# Patient Record
Sex: MALE | Race: WHITE | NOT HISPANIC OR LATINO | Employment: OTHER | URBAN - METROPOLITAN AREA
[De-identification: names, ages, dates, MRNs, and addresses within clinical notes are randomized per-mention and may not be internally consistent; named-entity substitution may affect disease eponyms.]

---

## 2017-11-20 ENCOUNTER — OUTSIDE FACILITY SERVICE (OUTPATIENT)
Dept: CARDIOLOGY | Facility: CLINIC | Age: 62
End: 2017-11-20

## 2017-11-20 PROCEDURE — 93306 TTE W/DOPPLER COMPLETE: CPT | Performed by: INTERNAL MEDICINE

## 2017-12-05 ENCOUNTER — OFFICE VISIT (OUTPATIENT)
Dept: CARDIOLOGY | Facility: CLINIC | Age: 62
End: 2017-12-05

## 2017-12-05 VITALS
SYSTOLIC BLOOD PRESSURE: 136 MMHG | HEART RATE: 73 BPM | DIASTOLIC BLOOD PRESSURE: 90 MMHG | WEIGHT: 241 LBS | HEIGHT: 70 IN | BODY MASS INDEX: 34.5 KG/M2

## 2017-12-05 DIAGNOSIS — E78.2 MIXED HYPERLIPIDEMIA: ICD-10-CM

## 2017-12-05 DIAGNOSIS — I48.0 PAROXYSMAL ATRIAL FIBRILLATION (HCC): Primary | ICD-10-CM

## 2017-12-05 DIAGNOSIS — I10 ESSENTIAL HYPERTENSION: ICD-10-CM

## 2017-12-05 DIAGNOSIS — E66.9 OBESITY (BMI 30-39.9): ICD-10-CM

## 2017-12-05 PROCEDURE — 93000 ELECTROCARDIOGRAM COMPLETE: CPT | Performed by: INTERNAL MEDICINE

## 2017-12-05 PROCEDURE — 99204 OFFICE O/P NEW MOD 45 MIN: CPT | Performed by: INTERNAL MEDICINE

## 2017-12-05 RX ORDER — ATORVASTATIN CALCIUM 20 MG/1
40 TABLET, FILM COATED ORAL EVERY OTHER DAY
COMMUNITY
End: 2022-08-09

## 2017-12-05 RX ORDER — IBUPROFEN 800 MG/1
800 TABLET ORAL EVERY 6 HOURS PRN
COMMUNITY

## 2017-12-05 RX ORDER — OMEPRAZOLE 20 MG/1
20 CAPSULE, DELAYED RELEASE ORAL DAILY
COMMUNITY

## 2017-12-05 RX ORDER — DILTIAZEM HYDROCHLORIDE 120 MG/1
240 TABLET, FILM COATED ORAL DAILY
COMMUNITY
End: 2018-06-19

## 2017-12-05 RX ORDER — DILTIAZEM HYDROCHLORIDE 240 MG/1
240 CAPSULE, COATED, EXTENDED RELEASE ORAL DAILY
Qty: 30 CAPSULE | Refills: 11 | Status: SHIPPED | OUTPATIENT
Start: 2017-12-05 | End: 2018-06-19 | Stop reason: SDUPTHER

## 2017-12-05 NOTE — PROGRESS NOTES
Encounter Date:12/05/2017    Location: [facility]    Masha Camacho MD    Patient ID: Alberto Ram is a 62 y.o. 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    1955  Subjective:      Chief Complaint/Reason for visit:    Chief Complaint   Patient presents with   • Atrial Fibrillation       Problem List:  1. Atrial Fibrillation   A. [facility] ED presentation 11/10/17 with EKG revealing Atrail Fibrillation at rate of 156. S/P External Cardioversion with restoration of NSR.  B. Echocardiogram 11/20/17 revealing LVEF 55 %, Trace MR, Trace TR   C. History of cardiac cath 12/21/2009 revealing normal coronary arteries, LVEF 55 %.  D. CHADSVasc= 1  2. Hypertension   3. Hyperlipidemia   4. GERD/Hiatal Hernia  5. Osteoarthritis    HPI: Mr. Ram is a 62 yr old white male with the above noted medical history who presents in consultation referred by Dr. Mendiola for further evaluation of Atrial Fibrillation. On 11/10/17 he awakened from sleep with a fast heart rate.  He had no associated symptoms.  He was noted to be in Atrial Fibrillation at rate of 156. He underwent an external cardioversion with restoration of normal sinus rhythm.  He was placed on Cardizem 120 mg po daily in the ED and has had no further symptoms.  He denies any symptoms of chest pain, shortness of breath, palpitations, edema, syncope or heart failure symptoms.    Social History     Social History   • Marital status:      Spouse name: N/A   • Number of children: N/A   • Years of education: N/A     Occupational History   • Not on file.     Social History Main Topics   • Smoking status: Never Smoker   • Smokeless tobacco: Never Used   • Alcohol use No   • Drug use: No   • Sexual activity: Not on file     Other Topics Concern   • Not on file     Social History Narrative   • No narrative on file       family history includes Cancer in his mother; Heart disease in his brother and sister.     has a past medical history of Atrial  "fibrillation; GERD (gastroesophageal reflux disease); Hyperlipidemia; Hypertension; and Osteoarthritis.    No Known Allergies      Current Outpatient Prescriptions:   •  aspirin 81 MG tablet, Take 81 mg by mouth Daily., Disp: , Rfl:   •  atorvastatin (LIPITOR) 20 MG tablet, Take 20 mg by mouth Daily., Disp: , Rfl:   •  diltiaZEM (CARDIZEM) 120 MG tablet, Take 120 mg by mouth Daily., Disp: , Rfl:   •  ibuprofen (ADVIL,MOTRIN) 800 MG tablet, Take 800 mg by mouth Every 6 (Six) Hours As Needed for Mild Pain ., Disp: , Rfl:   •  omeprazole (priLOSEC) 20 MG capsule, Take 20 mg by mouth Daily., Disp: , Rfl:     Review of Systems   Constitution: Positive for weight gain.   HENT: Negative.    Eyes: Negative.         Wears glasses   Cardiovascular: Negative for chest pain, dyspnea on exertion, irregular heartbeat, leg swelling, near-syncope, orthopnea, palpitations, paroxysmal nocturnal dyspnea and syncope.   Respiratory: Positive for snoring.    Endocrine: Negative.    Hematologic/Lymphatic: Negative.    Skin: Negative.    Musculoskeletal: Positive for arthritis, back pain and joint pain.   Gastrointestinal: Positive for heartburn.        Hiatal hernia   Genitourinary: Negative.    Neurological: Negative.    Psychiatric/Behavioral: Negative.    Allergic/Immunologic: Negative.        Vitals:    12/05/17 0940 12/05/17 1011   BP: 158/90 136/90   BP Location: Left arm    Patient Position: Sitting    Pulse: 73    Weight: 109 kg (241 lb)    Height: 177.8 cm (70\")          Objective:       Physical Exam   Constitutional: He is oriented to person, place, and time. He appears well-developed and well-nourished.   HENT:   Head: Normocephalic and atraumatic.   Neck: Normal range of motion. No thyromegaly present.   Cardiovascular: Normal rate, regular rhythm and normal heart sounds.  Exam reveals no gallop and no friction rub.    No murmur heard.  Pulmonary/Chest: Effort normal and breath sounds normal. No respiratory distress. He has " no wheezes. He has no rales.   Abdominal: Soft. Bowel sounds are normal.   Musculoskeletal: Normal range of motion. He exhibits no edema.   Neurological: He is alert and oriented to person, place, and time.   Skin: Skin is warm and dry.   Psychiatric: He has a normal mood and affect. His behavior is normal. Judgment and thought content normal.   Vitals reviewed.      Data Review:     ECG 12 Lead  Date/Time: 2017 10:05 AM  Performed by: HAZEL MCCULLOUGH  Authorized by: HAZEL MCCULLOUGH   Comparison: compared with previous ECG   Comparison to previous EC/10/17  Rhythm: sinus rhythm  Rate: normal  BPM: 74  ST Segments: ST segments normal  T Waves: T waves normal  QRS axis: normal  Other: no other findings  Clinical impression: normal ECG             Assessment:      Diagnosis Plan   1. Paroxysmal atrial fibrillation     2. Essential hypertension     3. Mixed hyperlipidemia     4. Obesity (BMI 30-39.9)       Plan:   1. Increase Cardizem to 240 mg po daily for better blood pressure control.  2. Continue ASA  3. Lipid profile and sleep evaluation per Dr. Mendiola  4. Follow up in 6 months  5. Weight loss recommended with diet and exercise.    Scribed for Hazel Mccullough MD by Dorothy Clemente. 2017  10:14 AM    I, Hazel Mccullough MD, personally performed the services described in this documentation as scribed by the above named individual in my presence, and it is both accurate and complete.  2017  11:41 AM        Please note that portions of this note may have been completed with a voice recognition program. Efforts were made to edit the dictations, but occasionally words are mistranscribed.

## 2018-06-19 ENCOUNTER — OFFICE VISIT (OUTPATIENT)
Dept: CARDIOLOGY | Facility: CLINIC | Age: 63
End: 2018-06-19

## 2018-06-19 VITALS
HEIGHT: 69 IN | SYSTOLIC BLOOD PRESSURE: 102 MMHG | HEART RATE: 64 BPM | DIASTOLIC BLOOD PRESSURE: 70 MMHG | WEIGHT: 229 LBS | BODY MASS INDEX: 33.92 KG/M2

## 2018-06-19 DIAGNOSIS — I48.0 PAROXYSMAL ATRIAL FIBRILLATION (HCC): Primary | ICD-10-CM

## 2018-06-19 DIAGNOSIS — E78.2 MIXED HYPERLIPIDEMIA: ICD-10-CM

## 2018-06-19 DIAGNOSIS — I10 ESSENTIAL HYPERTENSION: ICD-10-CM

## 2018-06-19 PROCEDURE — 99213 OFFICE O/P EST LOW 20 MIN: CPT | Performed by: INTERNAL MEDICINE

## 2018-06-19 RX ORDER — DILTIAZEM HYDROCHLORIDE 240 MG/1
240 CAPSULE, COATED, EXTENDED RELEASE ORAL DAILY
Qty: 30 CAPSULE | Refills: 11 | Status: SHIPPED | OUTPATIENT
Start: 2018-06-19 | End: 2019-07-01 | Stop reason: SDUPTHER

## 2018-06-19 RX ORDER — LISINOPRIL 20 MG/1
20 TABLET ORAL DAILY
COMMUNITY
End: 2019-07-16

## 2018-06-19 NOTE — PROGRESS NOTES
Encounter Date:06/19/2018      Patient ID: Alberto Ram is a 63 y.o. male.    Chief Complaint: Atrial Fibrillation    PROBLEM LIST:  1. Paroxysmal Atrial fibrillation:   a. [facility] ED presentation (11/10/2017) with EKG revealing Atrail Fibrillation at rate of 156. S/P External Cardioversion with restoration of NSR.  b. Echocardiogram (11/20/2017): revealing LVEF 55 %, Trace MR, Trace TR.  c. History of cardiac cath (12/21/2009): revealing normal coronary arteries, LVEF 55%.  d. CHADSVasc= 1.  2. Hypertension.   3. Hyperlipidemia.   4. GERD/Hiatal hernia.  5. Osteoarthritis.     History of Present Illness  Patient presents today for follow-up with a history of paroxysmal atrial fibrillation and cardiac risk factors.  Since last visit he has done very well from Cardec standpoint.  Has not experienced any recurrence of atrial fibrillation or palpitations. Denies chest pain, shortness of breath, leg swelling,  and syncope. Remains busy and active with various activities without limitations..    No Known Allergies      Current Outpatient Prescriptions:   •  aspirin 81 MG tablet, Take 81 mg by mouth Daily., Disp: , Rfl:   •  atorvastatin (LIPITOR) 20 MG tablet, Take 20 mg by mouth Daily. Off and on, Disp: , Rfl:   •  diltiaZEM CD (CARDIZEM CD) 240 MG 24 hr capsule, Take 1 capsule by mouth Daily., Disp: 30 capsule, Rfl: 11  •  ibuprofen (ADVIL,MOTRIN) 800 MG tablet, Take 800 mg by mouth Every 6 (Six) Hours As Needed for Mild Pain ., Disp: , Rfl:   •  lisinopril (PRINIVIL,ZESTRIL) 20 MG tablet, Take 20 mg by mouth Daily., Disp: , Rfl:   •  metFORMIN (GLUCOPHAGE) 500 MG tablet, Take 500 mg by mouth Daily With Breakfast., Disp: , Rfl:   •  omeprazole (priLOSEC) 20 MG capsule, Take 20 mg by mouth Daily., Disp: , Rfl:     The following portions of the patient's history were reviewed and updated as appropriate: allergies, current medications, past family history, past medical history, past social history, past  "surgical history and problem list.    ROS  Review of Systems   Constitution: Negative for chills, fever, weight gain and weight loss.   Cardiovascular: Negative for chest pain, claudication, dyspnea on exertion, leg swelling, orthopnea, palpitations, paroxysmal nocturnal dyspnea and syncope.        No dizziness   Gastrointestinal: Negative for abdominal pain, constipation, diarrhea, nausea and vomiting.   Genitourinary:        No urinary symptoms   Neurological:        No symptoms of stroke.   All other systems reviewed and are negative.    Objective:     Blood pressure 102/70, pulse 64, height 175.3 cm (69\"), weight 104 kg (229 lb).      Physical Exam  Constitutional: She appears well-developed and well-nourished.   HENT:   HEENT exam unremarkable.   Neck: Neck supple. No JVD present.   No carotid bruits.   Cardiovascular: Normal rate, regular rhythm and normal heart sounds.    No murmur heard.  2 plus symmetric pulses.   Pulmonary/Chest: Breath sounds normal. Does not exhibit tenderness.   Abdominal:   Abdomen benign.   Musculoskeletal: Does not exhibit edema.   Neurological:   Neurological exam unremarkable.   Vitals reviewed.    Lab Review:   Procedures       Assessment:      Diagnosis Plan   1. Paroxysmal atrial fibrillation  Maintaining sinus rhythm, stable.     2. Essential hypertension  Well controlled, continue current medications.     3. Mixed hyperlipidemia  Continue Lipitor.       Plan:   Stable cardiac status.  Continue current medications.   FU in 12 MO, sooner as needed.  Thank you for allowing us to participate in the care of your patient.     Madison Mccullough MD, FACC, [facility]        Please note that portions of this note may have been completed with a voice recognition program. Efforts were made to edit the dictations, but occasionally words are mistranscribed.    "

## 2019-07-01 RX ORDER — DILTIAZEM HYDROCHLORIDE 240 MG/1
240 CAPSULE, COATED, EXTENDED RELEASE ORAL DAILY
Qty: 30 CAPSULE | Refills: 1 | Status: SHIPPED | OUTPATIENT
Start: 2019-07-01 | End: 2019-07-16 | Stop reason: SDUPTHER

## 2019-07-16 ENCOUNTER — OFFICE VISIT (OUTPATIENT)
Dept: CARDIOLOGY | Facility: CLINIC | Age: 64
End: 2019-07-16

## 2019-07-16 VITALS
HEART RATE: 60 BPM | SYSTOLIC BLOOD PRESSURE: 112 MMHG | HEIGHT: 70 IN | BODY MASS INDEX: 34.5 KG/M2 | WEIGHT: 241 LBS | DIASTOLIC BLOOD PRESSURE: 70 MMHG

## 2019-07-16 DIAGNOSIS — E78.2 MIXED HYPERLIPIDEMIA: ICD-10-CM

## 2019-07-16 DIAGNOSIS — I10 ESSENTIAL HYPERTENSION: ICD-10-CM

## 2019-07-16 DIAGNOSIS — I48.0 PAROXYSMAL ATRIAL FIBRILLATION (HCC): Primary | ICD-10-CM

## 2019-07-16 PROBLEM — M19.90 OSTEOARTHRITIS: Status: ACTIVE | Noted: 2019-07-16

## 2019-07-16 PROBLEM — R73.03 PREDIABETES: Status: ACTIVE | Noted: 2019-07-16

## 2019-07-16 PROCEDURE — 99214 OFFICE O/P EST MOD 30 MIN: CPT | Performed by: PHYSICIAN ASSISTANT

## 2019-07-16 RX ORDER — DILTIAZEM HYDROCHLORIDE 240 MG/1
240 CAPSULE, COATED, EXTENDED RELEASE ORAL DAILY
Qty: 90 CAPSULE | Refills: 3 | Status: SHIPPED | OUTPATIENT
Start: 2019-07-16 | End: 2020-07-21 | Stop reason: SDUPTHER

## 2019-07-16 NOTE — PROGRESS NOTES
Encounter Date:07/16/2019        Patient ID: Alberto Ram is a 64 y.o. male.    PCP: Edison Trujillo MD     Chief Complaint: Paroxysmal atrial fibrillation (CMS/HCC)      PROBLEM LIST:  1. Paroxysmal atrial fibrillation:   a. [facility] ED presentation (11/10/2017) with EKG revealing Atrail Fibrillation at rate of 156. S/P ECV with restoration of NSR.  b. Echocardiogram (11/20/2017): EF 55 %, Trace MR, Trace TR.  c. History of cardiac cath (12/21/2009): revealing normal coronary arteries, LVEF 55%.  d. CHADSVasc= 1.  2. Hypertension.   3. Hyperlipidemia.   4. Prediabetes  5. GERD/Hiatal hernia.  6. Osteoarthritis.     History of Present Illness  Patient presents today for annual follow-up with a history of paroxysmal atrial fibrillation and cardiac risk factors.  Returns today with no complaint of exertional chest pain or dyspnea orthopnea no PND no claudication no lower extremity edema.  He has no awareness of tachyarrhythmias, no dizziness or syncope.  Blood pressures at home run between 120 to 130 mmHg.  His cholesterol is followed by primary care and reported to be favorable on current medical regimen.  He states compliance with current medical regimen reports no significant side effects.  He requests refill of diltiazem.    No Known Allergies      Current Outpatient Medications:   •  aspirin 81 MG tablet, Take 81 mg by mouth Daily., Disp: , Rfl:   •  atorvastatin (LIPITOR) 20 MG tablet, Take 20 mg by mouth Daily. Off and on, Disp: , Rfl:   •  diltiaZEM CD (CARDIZEM CD) 240 MG 24 hr capsule, Take 1 capsule by mouth Daily., Disp: 30 capsule, Rfl: 1  •  ibuprofen (ADVIL,MOTRIN) 800 MG tablet, Take 800 mg by mouth Every 6 (Six) Hours As Needed for Mild Pain ., Disp: , Rfl:   •  metFORMIN (GLUCOPHAGE) 500 MG tablet, Take 500 mg by mouth Daily With Breakfast., Disp: , Rfl:   •  omeprazole (priLOSEC) 20 MG capsule, Take 20 mg by mouth Daily., Disp: , Rfl:     The following portions of the patient's  "history were reviewed and updated as appropriate: allergies, current medications, past family history, past medical history, past social history, past surgical history and problem list.    ROS  Review of Systems   Constitution: Negative for chills, fatigue, fever, generalized weakness, weight gain and weight loss.   Cardiovascular: Negative for chest pain, claudication, dyspnea on exertion, leg swelling, orthopnea, palpitations, paroxysmal nocturnal dyspnea and syncope.   Respiratory: Negative for cough, shortness of breath, and wheezing.  HENT: Negative for ear pain, nosebleeds, and tinnitus.  Gastrointestinal: Negative for abdominal pain, constipation, diarrhea, nausea and vomiting.   Genitourinary: No urinary symptoms   Musculoskeletal: Negative for muscle cramps.  Neurological: Negative for dizziness, headaches, loss of balance, numbness, and symptoms of stroke.  Psychiatric: Normal mental status.     All other systems reviewed and are negative.    Objective:     /70 (BP Location: Right arm, Patient Position: Sitting)   Pulse 60   Ht 177.8 cm (70\")   Wt 109 kg (241 lb)   BMI 34.58 kg/m²          Physical Exam  Constitutional: Patient appears well-developed and well-nourished.   HENT: HEENT exam unremarkable.   Neck: Neck supple. No JVD present. No carotid bruits.   Cardiovascular: Normal rate, regular rhythm and normal heart sounds. No murmur heard.   2+ symmetric pulses.   Pulmonary/Chest: Breath sounds normal. Does not exhibit tenderness.   Abdominal: Abdomen benign.   Musculoskeletal: Does not exhibit edema.   Neurological: Neurological exam unremarkable.   Vitals reviewed.    Lab Review:      Procedures       Assessment:      Diagnosis Plan   1. Paroxysmal atrial fibrillation (CMS/HCC)   stable, maintaining sinus rhythm.  Refilled diltiazem at current dose   2. Essential hypertension  Well managed,  continue current therapy.     3. Mixed hyperlipidemia   on statin therapy followed by primary care "     Plan:     Stable cardiac status.  Continue current medications.   FU in 12 MO, sooner as needed.  Thank you for allowing us to participate in the care of your patient.         Electronically signed by HADLEY Phelps, 07/16/19, 9:49 AM.      Please note that portions of this note may have been completed with a voice recognition program. Efforts were made to edit the dictations, but occasionally words are mistranscribed.

## 2019-10-04 NOTE — PROGRESS NOTES
Patient: Alberto Ram    YOB: 1955    Date: 10/07/2019    Primary Care Provider: Fran Mendiola MD    Chief Complaint   Patient presents with   • Colon Cancer Screening       SUBJECTIVE:    History of present illness:  I saw the patient in the office today as a consultation for colon cancer screening.  5 years ago he underwent a colonoscopy and was found to have 2 adenomas of the colon.  No GI complaints.    The following portions of the patient's history were reviewed and updated as appropriate: allergies, current medications, past family history, past medical history, past social history, past surgical history and problem list.      Review of Systems   Constitutional: Negative for chills, diaphoresis, fatigue, fever and unexpected weight change.   HENT: Negative for dental problem, drooling, ear discharge, hearing loss, trouble swallowing and voice change.    Eyes: Negative for visual disturbance.   Respiratory: Negative for apnea, cough, choking, chest tightness, shortness of breath and wheezing.    Cardiovascular: Negative for chest pain, palpitations and leg swelling.   Gastrointestinal: Negative for abdominal distention, abdominal pain, blood in stool, constipation, diarrhea, nausea, rectal pain and vomiting.   Endocrine: Negative for cold intolerance and heat intolerance.   Genitourinary: Negative for difficulty urinating, dysuria, flank pain, frequency, hematuria, scrotal swelling and testicular pain.   Musculoskeletal: Negative for back pain, gait problem and joint swelling.   Skin: Negative for color change, rash and wound.   Neurological: Negative for dizziness, seizures, syncope, speech difficulty, weakness, light-headedness, numbness and headaches.   Hematological: Negative for adenopathy. Does not bruise/bleed easily.   Psychiatric/Behavioral: Negative for agitation, behavioral problems and confusion. The patient is not nervous/anxious.        History:  Past Medical History:  "  Diagnosis Date   • GERD (gastroesophageal reflux disease)    • Osteoarthritis        Past Surgical History:   Procedure Laterality Date   • CARDIAC CATHETERIZATION     • FOOT FRACTURE SURGERY Left    • ROTATOR CUFF REPAIR Left        Family History   Problem Relation Age of Onset   • Cancer Mother    • Heart disease Sister    • Heart disease Brother        Social History     Tobacco Use   • Smoking status: Never Smoker   • Smokeless tobacco: Never Used   Substance Use Topics   • Alcohol use: No   • Drug use: No       Medications:   Current Outpatient Medications:   •  aspirin 81 MG tablet, Take 81 mg by mouth Daily., Disp: , Rfl:   •  atorvastatin (LIPITOR) 20 MG tablet, Take 20 mg by mouth Daily. Off and on, Disp: , Rfl:   •  diltiaZEM CD (CARDIZEM CD) 240 MG 24 hr capsule, Take 1 capsule by mouth Daily., Disp: 90 capsule, Rfl: 3  •  ibuprofen (ADVIL,MOTRIN) 800 MG tablet, Take 800 mg by mouth Every 6 (Six) Hours As Needed for Mild Pain ., Disp: , Rfl:   •  metFORMIN (GLUCOPHAGE) 500 MG tablet, Take 500 mg by mouth Daily With Breakfast., Disp: , Rfl:   •  omeprazole (priLOSEC) 20 MG capsule, Take 20 mg by mouth Daily., Disp: , Rfl:        Allergies: No Known Allergies    OBJECTIVE:    Vital Signs:  Vitals:    10/07/19 1310   BP: 145/77   Pulse: 65   Temp: 97.8 °F (36.6 °C)   SpO2: 98%   Weight: 111 kg (245 lb)   Height: 177.8 cm (70\")       Physical Exam:   General Appearance:    Alert, cooperative, in no acute distress   Head:    Normocephalic, without obvious abnormality, atraumatic   Eyes:            Normal.  No scleral icterus.  PERRLA    Lungs:     Clear to auscultation,respirations regular, even and                  unlabored    Heart:    Regular rhythm and normal rate, normal S1 and S2,    Abdomen:     Normal bowel sounds, no masses, no organomegaly, soft        non-tender, non-distended, no guarding,    Extremities:   Moves all extremities well, no edema, no cyanosis, no             redness   Skin:   No " bleeding, bruising or rash   Neurologic:   Normal without gross deficits.   Psychiatric: No evidence of depression or anxiety        Results Review:   None    Review of Systems was reviewed and confirmed as accurate today.    ASSESSMENT/PLAN:    1. History of colon polyps        Recommend a screening colonoscopy for his history of adenomatous colon polyps removed 5 years ago.  I explained the procedure to the patient as well as the risks of bleeding and perforation and they understand the ramifications of these potential complications and they wish to proceed.     Electronically signed by Laci Lin MD  10/07/19  3:09 PM

## 2019-10-07 ENCOUNTER — OFFICE VISIT (OUTPATIENT)
Dept: SURGERY | Facility: CLINIC | Age: 64
End: 2019-10-07

## 2019-10-07 VITALS
WEIGHT: 245 LBS | SYSTOLIC BLOOD PRESSURE: 145 MMHG | HEART RATE: 65 BPM | OXYGEN SATURATION: 98 % | TEMPERATURE: 97.8 F | DIASTOLIC BLOOD PRESSURE: 77 MMHG | HEIGHT: 70 IN | BODY MASS INDEX: 35.07 KG/M2

## 2019-10-07 DIAGNOSIS — Z86.010 HISTORY OF COLON POLYPS: Primary | ICD-10-CM

## 2019-10-07 PROCEDURE — S0260 H&P FOR SURGERY: HCPCS | Performed by: SURGERY

## 2019-10-07 RX ORDER — BISACODYL 5 MG/1
TABLET, DELAYED RELEASE ORAL
Qty: 4 TABLET | Refills: 0 | Status: SHIPPED | OUTPATIENT
Start: 2019-10-07 | End: 2022-02-01

## 2019-10-07 RX ORDER — POLYETHYLENE GLYCOL 3350 17 G/17G
POWDER, FOR SOLUTION ORAL
Qty: 238 G | Refills: 0 | Status: SHIPPED | OUTPATIENT
Start: 2019-10-07 | End: 2022-09-08

## 2019-10-24 ENCOUNTER — TELEPHONE (OUTPATIENT)
Dept: SURGERY | Facility: CLINIC | Age: 64
End: 2019-10-24

## 2019-10-28 ENCOUNTER — OUTSIDE FACILITY SERVICE (OUTPATIENT)
Dept: SURGERY | Facility: CLINIC | Age: 64
End: 2019-10-28

## 2019-10-28 PROCEDURE — 45385 COLONOSCOPY W/LESION REMOVAL: CPT | Performed by: SURGERY

## 2020-07-21 ENCOUNTER — OFFICE VISIT (OUTPATIENT)
Dept: CARDIOLOGY | Facility: CLINIC | Age: 65
End: 2020-07-21

## 2020-07-21 VITALS
DIASTOLIC BLOOD PRESSURE: 70 MMHG | WEIGHT: 242 LBS | HEART RATE: 84 BPM | SYSTOLIC BLOOD PRESSURE: 148 MMHG | HEIGHT: 70 IN | BODY MASS INDEX: 34.65 KG/M2

## 2020-07-21 DIAGNOSIS — E78.2 MIXED HYPERLIPIDEMIA: ICD-10-CM

## 2020-07-21 DIAGNOSIS — I10 ESSENTIAL HYPERTENSION: ICD-10-CM

## 2020-07-21 DIAGNOSIS — I48.0 PAROXYSMAL ATRIAL FIBRILLATION (HCC): Primary | ICD-10-CM

## 2020-07-21 PROCEDURE — 99214 OFFICE O/P EST MOD 30 MIN: CPT | Performed by: INTERNAL MEDICINE

## 2020-07-21 RX ORDER — DILTIAZEM HYDROCHLORIDE 240 MG/1
240 CAPSULE, COATED, EXTENDED RELEASE ORAL DAILY
Qty: 90 CAPSULE | Refills: 3 | Status: SHIPPED | OUTPATIENT
Start: 2020-07-21 | End: 2021-08-17 | Stop reason: SDUPTHER

## 2020-07-21 NOTE — PROGRESS NOTES
[facility] Card[facility]    Encounter Date: 2020    Patient ID: Alberto Ram is a  65 y.o. male.  : 1955     PCP: Fran Mendiola MD       Chief Complaint: Paroxysmal atrial fibrillation (CMS/HCC)      PROBLEM LIST:  1. Paroxysmal atrial fibrillation:   a. [facility] ED presentation on 11/10/2017 with EKG revealing Atrial Fibrillation at rate of 156. S/P ECV with restoration of NSR.  b. Echo, 2017: EF 55 %. Trace MR and TR.  c. CHADSVasc= 1 (HTN)  2. Hypertension.   3. Hyperlipidemia.  4. History of normal LHC, 2009: Normal coronary arteries. EF 55%.  5. Prediabetes  6. GERD/Hiatal hernia.  7. Osteoarthritis.     History of Present Illness  Patient presents today for an annual follow-up with a history of paroxysmal atrial fibrillation and cardiac risk factors. Since last visit, he is done well.  He has no current complaint of exertional chest pain or dyspnea and orthopnea no PND no claudication no lower extremity edema.  He has no awareness of tachyarrhythmias, no dizziness or syncope.  His blood pressure at home runs between 110 to 130 mmHg systolic.  He had a recent lipid panel which is noted below.  He is currently taking atorvastatin every other day due to myalgias.  He states he has had myalgias with 3 previous statin drugs but is unclear which ones.  States he had a recent hemoglobin A1c which was 5.    No Known Allergies      Current Outpatient Medications:   •  aspirin 81 MG tablet, Take 81 mg by mouth Daily., Disp: , Rfl:   •  atorvastatin (LIPITOR) 20 MG tablet, Take 20 mg by mouth Daily. Off and on, Disp: , Rfl:   •  bisacodyl (bisacodyl) 5 MG EC tablet, Take 2 at 3pm and 2 at 7pm the day prior to colonoscopy., Disp: 4 tablet, Rfl: 0  •  diltiaZEM CD (CARDIZEM CD) 240 MG 24 hr capsule, Take 1 capsule by mouth Daily., Disp: 90 capsule, Rfl: 3  •  ibuprofen (ADVIL,MOTRIN) 800 MG tablet, Take 800 mg by mouth Every 6 (Six) Hours As Needed for  "Mild Pain ., Disp: , Rfl:   •  metFORMIN (GLUCOPHAGE) 500 MG tablet, Take 500 mg by mouth Daily With Breakfast., Disp: , Rfl:   •  omeprazole (priLOSEC) 20 MG capsule, Take 20 mg by mouth Daily., Disp: , Rfl:   •  polyethylene glycol (MIRALAX) powder, Mix 238g powder with 64 oz of clear liquid. Starting at 5pm drink 80z every 10-15 minutes until consumed., Disp: 238 g, Rfl: 0    The following portions of the patient's history were reviewed and updated as appropriate: allergies, current medications, past family history, past medical history, past social history, past surgical history and problem list.    ROS  Review of Systems   Constitution: Negative for chills, fever, fatigue, generalized weakness.   Cardiovascular: Negative for chest pain, dyspnea on exertion, leg swelling, palpitations, orthopnea, and syncope.   Respiratory: Negative for cough, shortness of breath, and wheezing.  HENT: Negative for ear pain, nosebleeds, and tinnitus.  Gastrointestinal: Negative for abdominal pain, constipation, diarrhea, nausea and vomiting.   Genitourinary: No urinary symptoms.  Musculoskeletal: Negative for muscle cramps.  Neurological: Negative for dizziness, headaches, loss of balance, numbness, and symptoms of stroke.  Psychiatric: Normal mental status.     All other systems reviewed and are negative.        Objective:     /70 (BP Location: Right arm, Patient Position: Sitting)   Pulse 84   Ht 177.8 cm (70\")   Wt 110 kg (242 lb)   BMI 34.72 kg/m²      Physical Exam  Constitutional: Patient appears well-developed and well-nourished.   HENT: HEENT exam unremarkable.   Neck: Neck supple. No JVD present. No carotid bruits.   Cardiovascular: Normal rate, regular rhythm and normal heart sounds. No murmur heard.   2+ symmetric pulses.   Pulmonary/Chest: Breath sounds normal. Does not exhibit tenderness.   Abdominal: Abdomen benign.   Musculoskeletal: Does not exhibit edema.   Neurological: Neurological exam unremarkable. "   Vitals reviewed.    Data Review:      Procedures       Assessment:      Diagnosis Plan   1. Paroxysmal atrial fibrillation (CMS/HCC)   chronically anticoagulated and maintaining sinus rhythm.  I have refilled his Xarelto at current dose and regimen for 1 year   2. Essential hypertension   overall well managed   3. Mixed hyperlipidemia   not to goal but intolerant to multiple statins.  He is expecting to follow-up with his primary care within the next month     Plan:     Continue current medications.   FU in 6 MO, sooner as needed.  Thank you for allowing us to participate in the care of your patient.         Electronically signed by HADLEY Phelps, 07/21/20, 10:04 AM.      Please note that portions of this note may have been completed with a voice recognition program. Efforts were made to edit the dictations, but occasionally words are mistranscribed.

## 2021-01-26 ENCOUNTER — OFFICE VISIT (OUTPATIENT)
Dept: CARDIOLOGY | Facility: CLINIC | Age: 66
End: 2021-01-26

## 2021-01-26 VITALS
DIASTOLIC BLOOD PRESSURE: 86 MMHG | HEART RATE: 72 BPM | WEIGHT: 235 LBS | HEIGHT: 70 IN | SYSTOLIC BLOOD PRESSURE: 142 MMHG | BODY MASS INDEX: 33.64 KG/M2

## 2021-01-26 DIAGNOSIS — I48.0 PAROXYSMAL ATRIAL FIBRILLATION (HCC): Primary | ICD-10-CM

## 2021-01-26 DIAGNOSIS — E78.2 MIXED HYPERLIPIDEMIA: ICD-10-CM

## 2021-01-26 DIAGNOSIS — I10 ESSENTIAL HYPERTENSION: ICD-10-CM

## 2021-01-26 PROBLEM — Z86.16 HISTORY OF COVID-19: Status: ACTIVE | Noted: 2021-01-26

## 2021-01-26 PROCEDURE — 99214 OFFICE O/P EST MOD 30 MIN: CPT | Performed by: INTERNAL MEDICINE

## 2021-01-26 NOTE — PROGRESS NOTES
[facility] Cardiology    Encounter Date: 2021    Patient ID: Alberto Ram is a 65 y.o. male.  : 1955     PCP: Fran Mendiola MD       Chief Complaint: Paroxysmal atrial fibrillation (CMS/HCC)      PROBLEM LIST:  1. Paroxysmal atrial fibrillation:   rafaela. [facility] ED presentation on 11/10/2017 with EKG revealing Atrial Fibrillation at rate of 156. S/P ECV with restoration of NSR.  b. Echo, 2017: EF 55 %. Trace MR and TR.  c. CHADSVasc= 1 (HTN)  2. Hypertension.   3. Hyperlipidemia.  4. History of normal LHC, 2009: Normal coronary arteries. EF 55%.  5. Prediabetes  6. GERD/Hiatal hernia.  7. Osteoarthritis.     History of Present Illness  Patient presents today for a 6 month follow-up with a history of paroxysmal trial fibrillation and cardiac risk factors. Since last visit, overall he has done well.  He reports having had COVID-19 a few months ago but was fortunate to only have headache and loss of taste and smell.  He has no current complaint exertional chest pain or dyspnea and orthopnea no PND no claudication no lower extremity edema.  He has no awareness of tachyarrhythmias, denies dizziness or syncope.  His blood pressures at home have consistently been running from 120 to 130 mmHg systolic.  When we saw him last year his lipids were not to goal on atorvastatin 20 mg daily.  Since that time his atorvastatin has been increased to 40 mg daily and he is due for repeat lipid panel with his primary care within the next month.  He has no report of myalgias.  He states compliance current med regimen reports no significant adverse side effects.  Request a refill of diltiazem.    No Known Allergies      Current Outpatient Medications:   •  aspirin 81 MG tablet, Take 81 mg by mouth Daily., Disp: , Rfl:   •  atorvastatin (LIPITOR) 20 MG tablet, Take 20 mg by mouth Daily. Off and on, Disp: , Rfl:   •  bisacodyl (bisacodyl) 5 MG EC tablet, Take 2 at 3pm and 2 at  "7pm the day prior to colonoscopy., Disp: 4 tablet, Rfl: 0  •  dilTIAZem CD (CARDIZEM CD) 240 MG 24 hr capsule, Take 1 capsule by mouth Daily., Disp: 90 capsule, Rfl: 3  •  ibuprofen (ADVIL,MOTRIN) 800 MG tablet, Take 800 mg by mouth Every 6 (Six) Hours As Needed for Mild Pain ., Disp: , Rfl:   •  metFORMIN (GLUCOPHAGE) 500 MG tablet, Take 500 mg by mouth Daily With Breakfast., Disp: , Rfl:   •  omeprazole (priLOSEC) 20 MG capsule, Take 20 mg by mouth Daily., Disp: , Rfl:   •  polyethylene glycol (MIRALAX) powder, Mix 238g powder with 64 oz of clear liquid. Starting at 5pm drink 80z every 10-15 minutes until consumed., Disp: 238 g, Rfl: 0    The following portions of the patient's history were reviewed and updated as appropriate: allergies, current medications, past family history, past medical history, past social history, past surgical history and problem list.          Objective:     /86 (BP Location: Left arm, Patient Position: Sitting)   Pulse 72   Ht 177.8 cm (70\")   Wt 107 kg (235 lb)   BMI 33.72 kg/m²      Constitutional:       Appearance: Well-developed.   Pulmonary:      Effort: Pulmonary effort is normal. No respiratory distress.      Breath sounds: Normal breath sounds. No wheezing. No rales.      Comments: Bases clear  Chest:      Chest wall: Not tender to palpatation.   Cardiovascular:      Normal rate. Regular rhythm.      No gallop.   Pulses:     Intact distal pulses.   Musculoskeletal: Normal range of motion.         Data Review:   Lab date: 04/30/2020  • FLP: , , HDL 47,   • CMP: Glu 172, BUN 19, Creat 0.99, eGFR 77, Na 140, K 4.4, Cl 105, CO2 26, Ca 8.7, Alk Phos 69, AST 40, ALT 39  • CBC: WBC 4.9, RBC 4.71, HGB 15.2, HCT 43.4, MCV 92.1, MCH 32.3,   • TSH: 3.9     Procedures       Assessment:      Diagnosis Plan   1. Paroxysmal atrial fibrillation (CMS/HCC)   maintaining sinus rhythm   2. Essential hypertension   overall well managed, refilled diltiazem at " current dose   3. Mixed hyperlipidemia   tolerating increased statin dose and due to be rechecked within the next month by primary care     Plan:     Continue current medications.   FU in 12 MO, sooner as needed.  Thank you for allowing us to participate in the care of your patient.     Electronically signed by HADLEY Phelps, 01/26/21, 1:40 PM EST.      Please note that portions of this note may have been completed with a voice recognition program. Efforts were made to edit the dictations, but occasionally words are mistranscribed.

## 2021-03-19 ENCOUNTER — TELEPHONE (OUTPATIENT)
Dept: CARDIOLOGY | Facility: CLINIC | Age: 66
End: 2021-03-19

## 2021-08-17 RX ORDER — DILTIAZEM HYDROCHLORIDE 240 MG/1
240 CAPSULE, COATED, EXTENDED RELEASE ORAL DAILY
Qty: 90 CAPSULE | Refills: 3 | Status: SHIPPED | OUTPATIENT
Start: 2021-08-17 | End: 2022-02-01 | Stop reason: SDUPTHER

## 2022-02-01 ENCOUNTER — OFFICE VISIT (OUTPATIENT)
Dept: CARDIOLOGY | Facility: CLINIC | Age: 67
End: 2022-02-01

## 2022-02-01 VITALS
SYSTOLIC BLOOD PRESSURE: 146 MMHG | HEART RATE: 85 BPM | HEIGHT: 70 IN | BODY MASS INDEX: 33.93 KG/M2 | DIASTOLIC BLOOD PRESSURE: 89 MMHG | WEIGHT: 237 LBS | OXYGEN SATURATION: 96 %

## 2022-02-01 DIAGNOSIS — I10 ESSENTIAL HYPERTENSION: ICD-10-CM

## 2022-02-01 DIAGNOSIS — E78.2 MIXED HYPERLIPIDEMIA: ICD-10-CM

## 2022-02-01 DIAGNOSIS — I48.0 PAROXYSMAL ATRIAL FIBRILLATION: Primary | ICD-10-CM

## 2022-02-01 PROCEDURE — 99214 OFFICE O/P EST MOD 30 MIN: CPT | Performed by: INTERNAL MEDICINE

## 2022-02-01 RX ORDER — BUTALBITAL, ACETAMINOPHEN AND CAFFEINE 50; 325; 40 MG/1; MG/1; MG/1
TABLET ORAL AS NEEDED
COMMUNITY
Start: 2022-01-08

## 2022-02-01 RX ORDER — DILTIAZEM HYDROCHLORIDE 360 MG/1
360 CAPSULE, EXTENDED RELEASE ORAL DAILY
Qty: 90 CAPSULE | Refills: 3 | Status: SHIPPED | OUTPATIENT
Start: 2022-02-01 | End: 2023-01-16

## 2022-08-02 NOTE — PROGRESS NOTES
[facility] Cardiology    Encounter Date: 2022    Patient ID: Alberto Ram is a 67 y.o. male.  : 1955     PCP: Fran Mendiola MD       Chief Complaint: Paroxysmal atrial fibrillation       PROBLEM LIST:  1. Paroxysmal atrial fibrillation:   [facility] ED presentation on 11/10/2017 with EKG revealing Atrial Fibrillation at rate of 156. S/P ECV with restoration of NSR.  b. Echo, 2017: EF 55 %. Trace MR and TR.  c. CHADSVasc= 1 (HTN)  2. Hypertension.   3. Hyperlipidemia.  4. History of normal LHC, 2009: Normal coronary arteries. EF 55%.  5. Prediabetes  6. GERD/Hiatal hernia.  7. Osteoarthritis.       History of Present Illness  Patient presents today for a 6 month follow-up with a history of paroxysmal atrial fibrillation and cardiac risk factors. Since last visit, the patient has been doing well overall from a cardiovascular standpoint. Patient maintains a stable activity level by mowing yards. Patient denies chest pain, shortness of breath, orthopnea, palpitations, edema, dizziness, and syncope.        No Known Allergies      Current Outpatient Medications:   •  aspirin 81 MG tablet, Take 81 mg by mouth Daily., Disp: , Rfl:   •  butalbital-acetaminophen-caffeine (FIORICET, ESGIC) -40 MG per tablet, As Needed., Disp: , Rfl:   •  dilTIAZem CD (CARDIZEM CD) 360 MG 24 hr capsule, Take 1 capsule by mouth Daily., Disp: 90 capsule, Rfl: 3  •  ibuprofen (ADVIL,MOTRIN) 800 MG tablet, Take 800 mg by mouth Every 6 (Six) Hours As Needed for Mild Pain ., Disp: , Rfl:   •  metFORMIN (GLUCOPHAGE) 500 MG tablet, Take 1,000 mg by mouth 2 (Two) Times a Day With Meals., Disp: , Rfl:   •  omeprazole (priLOSEC) 20 MG capsule, Take 20 mg by mouth Daily., Disp: , Rfl:   •  polyethylene glycol (MIRALAX) powder, Mix 238g powder with 64 oz of clear liquid. Starting at 5pm drink 80z every 10-15 minutes until consumed. (Patient taking differently: As Needed. Mix 238g  "powder with 64 oz of clear liquid. Starting at 5pm drink 80z every 10-15 minutes until consumed.), Disp: 238 g, Rfl: 0  •  rosuvastatin (CRESTOR) 40 MG tablet, Every Other Day., Disp: , Rfl:     The following portions of the patient's history were reviewed and updated as appropriate: allergies, current medications, past family history, past medical history, past social history, past surgical history and problem list.    ROS  Review of Systems   Constitution: Negative for chills, fever, fatigue, generalized weakness.   Cardiovascular: Negative for chest pain, dyspnea on exertion, leg swelling, palpitations, orthopnea, and syncope.   Respiratory: Negative for cough, shortness of breath, and wheezing.  HENT: Negative for ear pain, nosebleeds, and tinnitus.  Gastrointestinal: Negative for abdominal pain, constipation, diarrhea, nausea and vomiting.   Genitourinary: No urinary symptoms.  Musculoskeletal: Negative for muscle cramps.  Neurological: Negative for dizziness, headaches, loss of balance, numbness, and symptoms of stroke.  Psychiatric: Normal mental status.     All other systems reviewed and are negative.        Objective:     /80 (BP Location: Left arm, Patient Position: Sitting)   Pulse 70   Ht 177.8 cm (70\")   Wt 106 kg (233 lb 6.4 oz)   SpO2 97%   BMI 33.49 kg/m²      Physical Exam  Constitutional: Patient appears well-developed and well-nourished.   HENT: HEENT exam unremarkable.   Neck: Neck supple. No JVD present. No carotid bruits.   Cardiovascular: Normal rate, regular rhythm and normal heart sounds. No murmur heard.   2+ symmetric pulses.   Pulmonary/Chest: Breath sounds normal. Does not exhibit tenderness.   Abdominal: Abdomen benign.   Musculoskeletal: Does not exhibit edema.   Neurological: Neurological exam unremarkable.   Vitals reviewed.    Data Review:     Lab date: 05/17/2022  • FLP: , , HDL 44, LDL 47  • CMP: Glu 180, BUN 22, Creat 1.04, Na 139, K 4.7, Cl 106, CO2 24, Ca " 9.1, Alk Phos 59, AST 46, ALT 44  • HbA1c: 7.8        Procedures             Assessment:      Diagnosis Plan   1. Paroxysmal atrial fibrillation (HCC), no recent recurrences. Stable and well controlled. 48 hr Holter monitor to reassess if patient is having any recurring arrhythmias. Continue on diltiazem and aspirin.     2. Essential hypertension  Well controlled. Continue on diltiazem 360 mg daily for rate control and hypertension.     3. Mixed hyperlipidemia  Managed by PCP. Continue on rosuvastatin 40 mg daily for hyperlipidemia.       Plan:   48 hr Holter monitor to reassess if patient is having any recurring arrhythmias.     Continue current medications.   FU in 12 MO, sooner as needed.  Thank you for allowing us to participate in the care of your patient.       Scribed for Madison Mccullough MD by Jaclyn Rivera. 8/9/2022 09:52 EDT         I, Madison Mccullough MD, personally performed the services described in this documentation as scribed by the above named individual in my presence, and it is both accurate and complete.  8/9/2022  18:17 EDT      Please note that portions of this note may have been completed with a voice recognition program. Efforts were made to edit the dictations, but occasionally words are mistranscribed.

## 2022-08-09 ENCOUNTER — OFFICE VISIT (OUTPATIENT)
Dept: CARDIOLOGY | Facility: CLINIC | Age: 67
End: 2022-08-09

## 2022-08-09 VITALS
HEART RATE: 70 BPM | SYSTOLIC BLOOD PRESSURE: 122 MMHG | HEIGHT: 70 IN | DIASTOLIC BLOOD PRESSURE: 80 MMHG | OXYGEN SATURATION: 97 % | BODY MASS INDEX: 33.41 KG/M2 | WEIGHT: 233.4 LBS

## 2022-08-09 DIAGNOSIS — E78.2 MIXED HYPERLIPIDEMIA: ICD-10-CM

## 2022-08-09 DIAGNOSIS — I48.0 PAROXYSMAL ATRIAL FIBRILLATION: Primary | ICD-10-CM

## 2022-08-09 DIAGNOSIS — I10 ESSENTIAL HYPERTENSION: ICD-10-CM

## 2022-08-09 PROCEDURE — 99214 OFFICE O/P EST MOD 30 MIN: CPT | Performed by: INTERNAL MEDICINE

## 2022-08-09 RX ORDER — ROSUVASTATIN CALCIUM 40 MG/1
TABLET, COATED ORAL EVERY OTHER DAY
COMMUNITY
Start: 2022-07-05

## 2022-08-10 DIAGNOSIS — I48.0 PAROXYSMAL ATRIAL FIBRILLATION: Primary | ICD-10-CM

## 2022-08-31 ENCOUNTER — TELEPHONE (OUTPATIENT)
Dept: SURGERY | Facility: CLINIC | Age: 67
End: 2022-08-31

## 2022-09-08 ENCOUNTER — PREP FOR SURGERY (OUTPATIENT)
Dept: OTHER | Facility: HOSPITAL | Age: 67
End: 2022-09-08

## 2022-09-08 DIAGNOSIS — Z12.11 ENCOUNTER FOR COLONOSCOPY DUE TO HISTORY OF ADENOMATOUS COLONIC POLYPS: Primary | ICD-10-CM

## 2022-09-08 DIAGNOSIS — Z86.010 ENCOUNTER FOR COLONOSCOPY DUE TO HISTORY OF ADENOMATOUS COLONIC POLYPS: Primary | ICD-10-CM

## 2022-09-08 NOTE — TELEPHONE ENCOUNTER
PRESCREENING FOR OPEN ACCESS SCHEDULING    Alberto Ram, 1955  [id number]    09/08/22    If, the patient answers yes to any of the following questions the provider will be informed prior to scheduling open access for approval and documented in the chart.    [x]  Yes  [] No    1. Have you ever had a colonoscopy in the past?      When: 3 years ago       Where:       Polyps or other:     []  Yes  [x] No    2. Family history of colon cancer?      Relation:       Age of onset:       Do you currently have any of the following?    []  Yes  [x] No  Rectal bleeding, if so, how long?     []  Yes  [x] No  Abdominal pain, if so, how long?    []  Yes  [x] No  Constipation, if so, how long?    []  Yes  [x] No  Diarrhea, if so, how long?    []  Yes  [x] No  Weight loss, is so, how much?    [] Yes  [x] No  Small caliber stool, if so, how long?      Have you ever had any of the following conditions?    [] Yes  [x] No  Heart attack?      When?       Last cardiac workup?     Blood thinners?    [] Yes  [x] No   Lung problems, asthma or COPD?  [] Yes  [x] No  Oxygen required?       [] Yes  [x] No  Stroke?     [] Yes  [x] No  Have you ever had a reaction to anesthesia?

## 2022-09-09 RX ORDER — BISACODYL 5 MG
TABLET, DELAYED RELEASE (ENTERIC COATED) ORAL
Qty: 4 TABLET | Refills: 0 | Status: SHIPPED | OUTPATIENT
Start: 2022-09-09

## 2022-09-09 RX ORDER — POLYETHYLENE GLYCOL 3350 17 G/17G
238 POWDER, FOR SOLUTION ORAL ONCE
Qty: 17 PACKET | Refills: 0 | Status: SHIPPED | OUTPATIENT
Start: 2022-09-09 | End: 2022-09-09

## 2022-09-28 ENCOUNTER — TELEPHONE (OUTPATIENT)
Dept: SURGERY | Facility: CLINIC | Age: 67
End: 2022-09-28

## 2022-10-03 ENCOUNTER — OUTSIDE FACILITY SERVICE (OUTPATIENT)
Dept: SURGERY | Facility: CLINIC | Age: 67
End: 2022-10-03

## 2022-10-03 PROCEDURE — 45385 COLONOSCOPY W/LESION REMOVAL: CPT | Performed by: SURGERY

## 2023-01-16 RX ORDER — DILTIAZEM HYDROCHLORIDE 360 MG/1
CAPSULE, EXTENDED RELEASE ORAL
Qty: 90 CAPSULE | Refills: 0 | Status: SHIPPED | OUTPATIENT
Start: 2023-01-16

## 2023-05-02 RX ORDER — DILTIAZEM HYDROCHLORIDE 360 MG/1
CAPSULE, EXTENDED RELEASE ORAL
Qty: 90 CAPSULE | Refills: 1 | Status: SHIPPED | OUTPATIENT
Start: 2023-05-02

## 2023-10-19 RX ORDER — DILTIAZEM HYDROCHLORIDE 360 MG/1
CAPSULE, EXTENDED RELEASE ORAL
Qty: 90 CAPSULE | Refills: 0 | Status: SHIPPED | OUTPATIENT
Start: 2023-10-19